# Patient Record
Sex: FEMALE | Race: WHITE | NOT HISPANIC OR LATINO | URBAN - METROPOLITAN AREA
[De-identification: names, ages, dates, MRNs, and addresses within clinical notes are randomized per-mention and may not be internally consistent; named-entity substitution may affect disease eponyms.]

---

## 2024-01-04 ENCOUNTER — EMERGENCY (EMERGENCY)
Facility: HOSPITAL | Age: 34
LOS: 1 days | Discharge: HOME | End: 2024-01-04
Admitting: EMERGENCY MEDICINE
Payer: COMMERCIAL

## 2024-01-04 VITALS
HEIGHT: 67 IN | OXYGEN SATURATION: 98 % | SYSTOLIC BLOOD PRESSURE: 120 MMHG | DIASTOLIC BLOOD PRESSURE: 65 MMHG | TEMPERATURE: 98 F | WEIGHT: 125 LBS | RESPIRATION RATE: 16 BRPM | HEART RATE: 70 BPM

## 2024-01-04 PROCEDURE — 99284 EMERGENCY DEPT VISIT MOD MDM: CPT | Mod: 25

## 2024-01-04 PROCEDURE — 12001 RPR S/N/AX/GEN/TRNK 2.5CM/<: CPT

## 2024-01-04 NOTE — ED PROVIDER NOTE - PHYSICAL EXAMINATION
Constitutional: Well appearing, awake, alert, oriented to person, place, time/situation and in no apparent distress.  HEENT: Airway patent, NCAT  Resp: no conversational dyspnea, tachypnea, or signs of respiratory distress  Msk: ambulating with normal steady gait  Neuro: A&Ox3   Skin: 1.5cm laceration across the base of the volar aspect of distal phalanx of left 2nd digit, well approximated, normal strength of flexion without pain, normal sensation distally, cap refill < 2 seconds.

## 2024-01-04 NOTE — ED PROVIDER NOTE - OBJECTIVE STATEMENT
32yo F presents with laceration to left index finger from using a new sharp knife. cleaned it with soap and water pta. no other injuries. no numbness or weakness. tetanus utd. RHD.

## 2024-01-04 NOTE — ED ADULT NURSE NOTE - OBJECTIVE STATEMENT
pt reports laceration on left second finger after accidentally getting cut by sharp kitchen knife; states up-to-date with tetanus immunization; bleeding controlled currently; distal CSM intact with CRT <2secs on affected finger; pt alert & oriented x4, in no acute distress

## 2024-01-04 NOTE — ED PROVIDER NOTE - CLINICAL SUMMARY MEDICAL DECISION MAKING FREE TEXT BOX
pt presents with laceration to left index finger. cleaned with betadine. NVID. no tendon injury. lac closed as per procedure noted. dressed with bacitracin. will d/c.

## 2024-01-04 NOTE — ED ADULT NURSE NOTE - NSFALLUNIVINTERV_ED_ALL_ED
Bed/Stretcher in lowest position, wheels locked, appropriate side rails in place/Call bell, personal items and telephone in reach/Instruct patient to call for assistance before getting out of bed/chair/stretcher/Non-slip footwear applied when patient is off stretcher/[place] to call system/Physically safe environment - no spills, clutter or unnecessary equipment/Purposeful proactive rounding/Room/bathroom lighting operational, light cord in reach Bed/Stretcher in lowest position, wheels locked, appropriate side rails in place/Call bell, personal items and telephone in reach/Instruct patient to call for assistance before getting out of bed/chair/stretcher/Non-slip footwear applied when patient is off stretcher/[place] to call system/Physically safe environment - no spills, clutter or unnecessary equipment/Purposeful proactive rounding/Room/bathroom lighting operational, light cord in reach

## 2024-01-04 NOTE — ED PROVIDER NOTE - NSFOLLOWUPINSTRUCTIONS_ED_ALL_ED_FT
Laceration    A laceration is a cut that goes through all of the layers of the skin and into the tissue that is right under the skin. Some lacerations heal on their own. Others need to be closed with skin adhesive strips, skin glue, stitches (sutures), or staples. Proper laceration care minimizes the risk of infection and helps the laceration to heal better.  If non-absorbable stitches or staples have been placed, they must be taken out within the time frame instructed by your healthcare provider.    SEEK IMMEDIATE MEDICAL CARE IF YOU HAVE ANY OF THE FOLLOWING SYMPTOMS: swelling around the wound, worsening pain, drainage from the wound, red streaking going away from your wound, inability to move finger or toe near the laceration, or discoloration of skin near the laceration.     KEEP DRESSING IN PLACE FOR 24 HOURS. AFTER THAT CHANGE DAILY. APPLY ANTIBIOTIC OINTMENT. OKAY TO WASH AND SHOWER NORMALLY BUT DO NOT SUBMERGE. AVOID PRESSURE/FRICTION LIKE WEIGHT LIFTING.  OKAY TO LEAVE OPEN TO AIR IN CLEAN ENVIRONMENT AFTER 5 DAYS TO PREVENT TOO MUCH MOISTURE.     DRESSING SHOULD BE SNUG BUT NOT OVERLY TIGHT. LOOSEN OR REMOVE DRESSING IF IT FEELS TOO TIGHT IE TINGLING, COOLNESS, DISCOLORATION, PAIN.     RETURN FOR SUTURE REMOVAL IN 10-14 DAYS.

## 2024-01-04 NOTE — ED PROVIDER NOTE - PATIENT PORTAL LINK FT
You can access the FollowMyHealth Patient Portal offered by [facility] by registering at the following website: [email or web address]. By joining [employer]’s FollowMyHealth portal, you will also be able to view your health information using other applications (apps) compatible with our system. You can access the FollowMyHealth Patient Portal offered by [facility] by registering at the following website: [email or web address]. By joining [employer]’s FollowMyHealth portal, you will also be able to view your health information using other applications (apps) compatible with our system.

## 2024-01-04 NOTE — ED PROCEDURE NOTE - CPROC ED TIME OUT STATEMENT1
“Patient's name, , procedure and correct site were confirmed during the [place] Timeout.” “Patient's name, , procedure and correct site were confirmed during the [place] Timeout.”
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
## 2024-01-06 DIAGNOSIS — S61.211A LACERATION WITHOUT FOREIGN BODY OF LEFT INDEX FINGER WITHOUT DAMAGE TO NAIL, INITIAL ENCOUNTER: ICD-10-CM
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
## 2024-01-17 ENCOUNTER — EMERGENCY (EMERGENCY)
Facility: HOSPITAL | Age: 34
LOS: 1 days | Discharge: HOME | End: 2024-01-17
Attending: EMERGENCY MEDICINE | Admitting: EMERGENCY MEDICINE
Payer: COMMERCIAL

## 2024-01-17 VITALS
RESPIRATION RATE: 17 BRPM | SYSTOLIC BLOOD PRESSURE: 115 MMHG | HEART RATE: 61 BPM | DIASTOLIC BLOOD PRESSURE: 74 MMHG | TEMPERATURE: 98 F | OXYGEN SATURATION: 99 % | HEIGHT: 67 IN

## 2024-01-17 DIAGNOSIS — S61.211D LACERATION WITHOUT FOREIGN BODY OF LEFT INDEX FINGER WITHOUT DAMAGE TO NAIL, SUBSEQUENT ENCOUNTER: ICD-10-CM

## 2024-01-17 PROCEDURE — L9995: CPT

## 2024-01-17 NOTE — ED PROVIDER NOTE - PATIENT PORTAL LINK FT
You can access the FollowMyHealth Patient Portal offered by [facility] by registering at the following website: [email or web address]. By joining [facility] [employer]’s FollowMyHealth portal, you will also be able to view your health information using other applications (apps) compatible with our system.

## 2024-01-17 NOTE — ED PROVIDER NOTE - PHYSICAL EXAMINATION
Gen: Well-developed, well-nourished, NAD, HEENT: NCAT, mmm   Chest: RRR, nl S1 and S2, no m/r/g. Resp: CTAB, no w/r/r  Abd: nl BS, soft, nt/nd. Ext: Warm, dry. Sutured laceration to volar aspect of left index finger C/D/I.

## 2024-01-17 NOTE — ED ADULT NURSE REASSESSMENT NOTE - NS ED NURSE REASSESS COMMENT FT1
Pt dc and all education given and gone over with pt. Pt has no further questions and self ambulating out of ed with all belongings and ppwrk. Care complete

## 2024-01-17 NOTE — ED PROVIDER NOTE - CARE PLAN
1 Principal Discharge DX:	Laceration of finger of left hand without foreign body, subsequent encounter

## 2024-01-17 NOTE — ED ADULT NURSE NOTE - OBJECTIVE STATEMENT
Pt presents to ed for syture removal. Left index finger 2 weeks prior, 3 sutures placed. No fever, pain reported. Plan of care ongoing

## 2024-01-17 NOTE — ED PROVIDER NOTE - OBJECTIVE STATEMENT
Patient had laceration repaired to left index finger 2 weeks ago. returns for suture removal. No swelling, redness, drainage, fever.

## 2024-01-18 PROBLEM — Z78.9 OTHER SPECIFIED HEALTH STATUS: Chronic | Status: ACTIVE | Noted: 2024-01-04
